# Patient Record
Sex: FEMALE | Race: WHITE | NOT HISPANIC OR LATINO
[De-identification: names, ages, dates, MRNs, and addresses within clinical notes are randomized per-mention and may not be internally consistent; named-entity substitution may affect disease eponyms.]

---

## 2019-06-13 ENCOUNTER — HOSPITAL (OUTPATIENT)
Dept: OTHER | Age: 44
End: 2019-06-13

## 2019-06-13 LAB
ALBUMIN SERPL-MCNC: 4 G/DL (ref 3.6–5.1)
ALBUMIN/GLOB SERPL: 1 {RATIO} (ref 1–2.4)
ALP SERPL-CCNC: 69 UNITS/L (ref 45–117)
ALT SERPL-CCNC: 22 UNITS/L
ANALYZER ANC (IANC): NORMAL
ANION GAP SERPL CALC-SCNC: 8 MMOL/L (ref 10–20)
AST SERPL-CCNC: 17 UNITS/L
BASOPHILS # BLD: 0 K/MCL (ref 0–0.3)
BASOPHILS NFR BLD: 1 %
BILIRUB SERPL-MCNC: 0.2 MG/DL (ref 0.2–1)
BUN SERPL-MCNC: 10 MG/DL (ref 6–20)
BUN/CREAT SERPL: 13 (ref 7–25)
CALCIUM SERPL-MCNC: 9.8 MG/DL (ref 8.4–10.2)
CHLORIDE SERPL-SCNC: 107 MMOL/L (ref 98–107)
CO2 SERPL-SCNC: 29 MMOL/L (ref 21–32)
CREAT SERPL-MCNC: 0.78 MG/DL (ref 0.51–0.95)
DIFFERENTIAL METHOD BLD: NORMAL
EOSINOPHIL # BLD: 0.2 K/MCL (ref 0.1–0.5)
EOSINOPHIL NFR BLD: 4 %
ERYTHROCYTE [DISTWIDTH] IN BLOOD: 12.6 % (ref 11–15)
GLOBULIN SER-MCNC: 3.9 G/DL (ref 2–4)
GLUCOSE SERPL-MCNC: 94 MG/DL (ref 65–99)
HCT VFR BLD CALC: 41.4 % (ref 36–46.5)
HGB BLD-MCNC: 13.6 G/DL (ref 12–15.5)
IMM GRANULOCYTES # BLD AUTO: 0 K/MCL (ref 0–0.2)
IMM GRANULOCYTES NFR BLD: 0 %
LIPASE SERPL-CCNC: 87 UNITS/L (ref 73–393)
LYMPHOCYTES # BLD: 1.4 K/MCL (ref 1–4.8)
LYMPHOCYTES NFR BLD: 26 %
MAGNESIUM SERPL-MCNC: 2.6 MG/DL (ref 1.7–2.4)
MCH RBC QN AUTO: 32.8 PG (ref 26–34)
MCHC RBC AUTO-ENTMCNC: 32.9 G/DL (ref 32–36.5)
MCV RBC AUTO: 99.8 FL (ref 78–100)
MONOCYTES # BLD: 0.7 K/MCL (ref 0.3–0.9)
MONOCYTES NFR BLD: 13 %
NEUTROPHILS # BLD: 2.9 K/MCL (ref 1.8–7.7)
NEUTROPHILS NFR BLD: 56 %
NEUTS SEG NFR BLD: NORMAL %
NRBC (NRBCRE): 0 /100 WBC
PLATELET # BLD: 210 K/MCL (ref 140–450)
POTASSIUM SERPL-SCNC: 2.9 MMOL/L (ref 3.4–5.1)
PROT SERPL-MCNC: 7.9 G/DL (ref 6.4–8.2)
RBC # BLD: 4.15 MIL/MCL (ref 4–5.2)
SODIUM SERPL-SCNC: 141 MMOL/L (ref 135–145)
TROPONIN I SERPL HS-MCNC: <0.02 NG/ML
WBC # BLD: 5.3 K/MCL (ref 4.2–11)

## 2024-10-29 ENCOUNTER — TELEPHONE (OUTPATIENT)
Dept: ORTHOPEDICS CLINIC | Facility: CLINIC | Age: 49
End: 2024-10-29

## 2024-10-29 DIAGNOSIS — M25.571 RIGHT ANKLE PAIN, UNSPECIFIED CHRONICITY: Primary | ICD-10-CM

## 2024-10-29 NOTE — TELEPHONE ENCOUNTER
Do you see for this?  Please advise.  Thank you!    DOI 10/18/24    XR  10/18/24  FINDINGS:3 views of the right ankle redemonstrates undisplaced fracture of the lateral malleolus. Medial malleolus intact. The ankle joint has been immobilized externally     IMPRESSION:   External immobilization of the undisplaced transverse fracture of the lateral malleolus

## 2024-10-29 NOTE — TELEPHONE ENCOUNTER
Nurse from [facility] where patient resides called to schedule appt. Patient has a right ankle fx and was seen at [facility] in [place] on 10/18. Nurse was advised to get imaging disc for appt. Nurse stated she is unsure if they will be able to get the disc as it would have to be mailed but they are okay with having imaging repeated on day of appt. Please advise, thank you.    Future Appointments   Date Time Provider Department Center   11/1/2024 11:00 AM [id number]

## 2024-10-30 NOTE — TELEPHONE ENCOUNTER
Pierce hudson PA to [facility]        10/30/24  9:35 AM   I can see next available, new x-rays. Thanks!

## 2024-11-01 ENCOUNTER — OFFICE VISIT (OUTPATIENT)
Dept: ORTHOPEDICS CLINIC | Facility: CLINIC | Age: 49
End: 2024-11-01
Payer: MEDICAID

## 2024-11-01 ENCOUNTER — HOSPITAL ENCOUNTER (OUTPATIENT)
Dept: GENERAL RADIOLOGY | Age: 49
Discharge: HOME | End: 2024-11-01
Attending: PHYSICIAN ASSISTANT
Payer: MEDICAID

## 2024-11-01 DIAGNOSIS — M25.571 RIGHT ANKLE PAIN, UNSPECIFIED CHRONICITY: ICD-10-CM

## 2024-11-01 DIAGNOSIS — S82.891A CLOSED FRACTURE OF RIGHT ANKLE, INITIAL ENCOUNTER: Primary | ICD-10-CM

## 2024-11-01 PROCEDURE — 73610 X-RAY EXAM OF ANKLE: CPT | Performed by: PHYSICIAN ASSISTANT

## 2024-11-01 NOTE — H&P
[facility] - ORTHOPEDICS  [address] [place] [id number]  [phone]     NEW PATIENT VISIT - HISTORY AND PHYSICAL EXAMINATION     Name: Glenn Ryan   MRN: [id number]  Date: 11/1/2024     CC: Right ankle pain.     REFERRED BY: Self    HPI:   Glenn Ryan is a very pleasant 49 year old female who presents today for evaluation, consultation, and management of right ankle pain discomfort described as swelling, stiffness, and instability sustained an injury that occurred on October 18, 2024. She twisted her right ankle on a sidewalk. Pain is a 9/10.       PMH:   History reviewed. No pertinent past medical history.    PAST SURGICAL HX:  History reviewed. No pertinent surgical history.    FAMILY HX:  History reviewed. No pertinent family history.    ALLERGIES:  Morphine and Lactose    MEDICATIONS:   No current outpatient medications on file.       ROS: A comprehensive 14 point review of systems was performed and was negative aside from the aforementioned per history of present illness.    SOCIAL HX:  Social History     Occupational History    Not on file   Tobacco Use    Smoking status: Not on file    Smokeless tobacco: Not on file   Substance and Sexual Activity    Alcohol use: Not on file    Drug use: Not on file    Sexual activity: Not on file       PE:   There were no vitals filed for this visit.  There is no height or weight on file to calculate BMI.    Physical Exam  Constitutional:       Appearance: Normal appearance.   HENT:      Head: Normocephalic and atraumatic.   Eyes:      Extraocular Movements: Extraocular movements intact.   Neck:      Musculoskeletal: Normal range of motion and neck supple.   Cardiovascular:      Pulses: Normal pulses.   Pulmonary:      Effort: Pulmonary effort is normal. No respiratory distress.   Abdominal:      General: There is no distension.   Skin:     General: Skin is warm.      Capillary Refill: Capillary refill takes less than 2  seconds.      Findings: No bruising.   Neurological:      General: No focal deficit present.      Mental Status: Alert.   Psychiatric:         Mood and Affect: Mood normal.     Examination of the right  ankle demonstrates:     Skin is intact, warm and dry.     Inspection:   Atrophy: none    Effusion: moderate    Edema: moderate    Erythema: none    Hematoma: significant      Palpation:   Tenderness at fracture site.     ROM:   Not tested d/t nature of injury and fracture.     Strength: Not tested d/t nature of injury and fracture.     Special Tests:   Not tested d/t nature of injury and fracture.     No obvious peripheral edema noted.   Distal neurovascular exam demonstrates normal perfusion, intact sensation to light touch and full strength.     Examination of the contralateral extremity demonstrates:  No significant atrophy, swelling or effusion. Full range of motion. Neurovascularly intact distally.  The contralateral upper extremity is without limitation in range of motion or strength, no positive provocative maneuvers.     Radiographic Examination/Diagnostics:  I personally viewed, independently interpreted and radiology report was reviewed.    XR ANKLE (MIN 3 VIEWS), RIGHT (CPT=73610)    Result Date: 11/1/2024  CONCLUSION:  1. Minimally displaced transverse fracture involves the lateral malleolus with adjacent soft tissue swelling.  Please see details as above.   LOCATION:  [facility]   Dictated by (CST): Leslye Ferreira MD on 11/01/2024 at 12:25 PM     Finalized by (CST): Leslye Ferreira MD on 11/01/2024 at 12:27 PM        IMPRESSION: Glenn Ryan is a 49 year old female who presents with right closed distal fibula fracture with minimal displacement. Amenable to nonsurgical treatment with initial splinting and transition to CAM boot / initiation of PT.     PLAN:   We had a detailed discussion outlining the etiology, anatomy, pathophysiology, and natural history of the patient's findings. Imaging was reviewed in detail  and correlated to a 3-dimensional model of the patient's pathology.     We reviewed the treatment of this disease condition.  This injury pattern is amenable to nonsurgical treatment.     FOLLOW-UP:  Return in 2 weeks for evaluation with Karenr Nitin and transition to CAM boot.         Julieta Moe MD  Knee, Shoulder, & Elbow Surgery / Sports Medicine Specialist  Orthopaedic Surgery  [address] [place] [address] [id number] [place] [email or web address]  [email or web address]  t: [phone]  o: [phone]  f: [phone]

## 2024-11-20 ENCOUNTER — OFFICE VISIT (OUTPATIENT)
Dept: ORTHOPEDICS CLINIC | Facility: CLINIC | Age: 49
End: 2024-11-20
Payer: MEDICAID

## 2024-11-20 ENCOUNTER — HOSPITAL ENCOUNTER (OUTPATIENT)
Dept: GENERAL RADIOLOGY | Age: 49
Discharge: HOME | End: 2024-11-20
Attending: PHYSICIAN ASSISTANT
Payer: MEDICAID

## 2024-11-20 DIAGNOSIS — S82.891A CLOSED FRACTURE OF RIGHT ANKLE, INITIAL ENCOUNTER: ICD-10-CM

## 2024-11-20 DIAGNOSIS — S82.891A CLOSED FRACTURE OF RIGHT ANKLE, INITIAL ENCOUNTER: Primary | ICD-10-CM

## 2024-11-20 PROCEDURE — 73610 X-RAY EXAM OF ANKLE: CPT | Performed by: PHYSICIAN ASSISTANT

## 2024-11-20 NOTE — PROGRESS NOTES
[facility] - ORTHOPEDICS  [address] [place] [address]  [phone]       Name: Glenn Ryan   MRN: [id number]  Date: 11/20/2024     REASON FOR VISIT: Follow up for right distal fibula fracture, minimally displaced.     INTERVAL HISTORY:  Glenn Ryan is a 49 year old female who returns for evaluation of a right distal fibular minimally displaced fracture managed conservatively.  The patient previously saw Dr. Abhi rodriguez who recommended nonsurgical management.  She presents today for follow-up.  Overall doing well.    ROS: ROS    PE:   There were no vitals filed for this visit.  There is no height or weight on file to calculate BMI.    Physical Exam  Constitutional:       Appearance: Normal appearance.   HENT:      Head: Normocephalic and atraumatic.   Eyes:      Extraocular Movements: Extraocular movements intact.   Neck:      Musculoskeletal: Normal range of motion and neck supple.   Cardiovascular:      Pulses: Normal pulses.   Pulmonary:      Effort: Pulmonary effort is normal. No respiratory distress.   Abdominal:      General: There is no distension.   Skin:     General: Skin is warm.      Capillary Refill: Capillary refill takes less than 2 seconds.      Findings: No bruising.   Neurological:      General: No focal deficit present.      Mental Status: She is alert.   Psychiatric:         Mood and Affect: Mood normal.     Examination of the right  ankle demonstrates:     Skin is intact, warm and dry.     Inspection:   Atrophy: none    Effusion: none    Edema: none    Erythema: none    Hematoma: none      Palpation:   Tenderness at fracture site.     ROM:   Not tested d/t nature of injury and fracture.     Strength: Not tested d/t nature of injury and fracture.     Special Tests:   Not tested d/t nature of injury and fracture.     No obvious peripheral edema noted.   Distal neurovascular exam demonstrates normal perfusion, intact sensation to light touch and full  strength.     Examination of the contralateral extremity demonstrates:  No significant atrophy, swelling or effusion. Full range of motion. Neurovascularly intact distally.  The contralateral upper extremity is without limitation in range of motion or strength, no positive provocative maneuvers.     Radiographic Examination/Diagnostics:    I personally viewed, independently interpreted and radiology report was reviewed.    XR ANKLE (MIN 3 VIEWS), RIGHT (CPT=73610)    Result Date: 11/1/2024  PROCEDURE:  XR ANKLE (MIN 3 VIEWS), RIGHT (CPT=73610)  TECHNIQUE:  Three views were obtained.  COMPARISON:  None.  INDICATIONS:  M25.571 Right ankle pain, unspecified chronicity  PATIENT STATED HISTORY: (As transcribed by Technologist)  Ortho evaluation. Patient fell and fractured her right ankle on 10/18/24.    FINDINGS:  Transverse fracture involves the lateral malleolus with approximately 3 mm of displacement.  There is adjacent soft tissue swelling.  No dislocation.  On the lateral projection is slight cortical irregularity involving the dorsal aspect of the  talus bone, a fracture cannot be excluded, correlate clinically.  Mild posterior calcaneal enthesopathy.            CONCLUSION:  1. Minimally displaced transverse fracture involves the lateral malleolus with adjacent soft tissue swelling.  Please see details as above.   LOCATION:  [facility]   Dictated by (CST): Leslye Ferreira MD on 11/01/2024 at 12:25 PM     Finalized by (CST): Leslye Ferreira MD on 11/01/2024 at 12:27 PM         IMPRESSION: Glenn Ryan is a 49 year old female who presented for follow up of right distal fibular fracture.   PLAN:   We had a detailed discussion outlining the etiology, anatomy, pathophysiology, and natural history of the patient's findings.    We reviewed the treatment of this disease condition. Transition to CAM boot and begin PT in two weeks. We recommended physical therapy to aid in strengthening, range of motion, functional improvement, and  return to baseline activity.  The patient had opportunity to ask questions and all questions were answered appropriately.    FOLLOW-UP:  6 weeks with repeat x-rays.             Sincer MARNI Taveras [facility], PA-C Orthopedic Surgery / Sports Medicine Specialist  [facility] Orthopaedic Surgery  [address] [place] [id number]   [email or web address]  [email or web address]  t: [phone]  o: [phone]  f: [phone]    This note was dictated using Dragon software.  While it was briefly proofread prior to completion, some grammatical, spelling, and word choice errors due to dictation may still occur.

## 2024-12-17 ENCOUNTER — OFFICE VISIT (OUTPATIENT)
Dept: PHYSICAL THERAPY | Facility: HOSPITAL | Age: 49
End: 2024-12-17
Attending: PHYSICIAN ASSISTANT
Payer: MEDICAID

## 2024-12-17 DIAGNOSIS — S82.891A CLOSED FRACTURE OF RIGHT ANKLE, INITIAL ENCOUNTER: Primary | ICD-10-CM

## 2024-12-17 PROCEDURE — 97110 THERAPEUTIC EXERCISES: CPT | Performed by: PHYSICAL THERAPIST

## 2024-12-17 PROCEDURE — 97163 PT EVAL HIGH COMPLEX 45 MIN: CPT | Performed by: PHYSICAL THERAPIST

## 2024-12-17 NOTE — PROGRESS NOTES
LOWER EXTREMITY EVALUATION:     Diagnosis:   Closed fracture of right ankle, initial encounter (S82.891A)       Referring Provider: Pierce Taveras  Date of Evaluation:    12/17/2024    Precautions:   lives in a supported house  Next MD visit:   none scheduled  Date of Surgery: n/a     PATIENT SUMMARY   Glenn Ryan is a 49 year old female who presents to therapy today , accompanied with her nursing support, Layne . with complaints of right ankle pain , after she tripped in a pot hole and fell.    Accident happened on 10/18/24;  she was immobilized in cast, NWB with use of walker for home and community . At last MD visit, 11/20/24 she was given a CAM boot for gait.  Currently she is living at an assisted living home where there  is no stairs, and has been unable to attend her [employer] day program due to her WB status  and the need for a walker for gait.   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  Hip pain for awhile prior to injury, but then workup was postponed [ due to injury   She lives in a home with 13 stairs but has care giver assistance 24/7.   Currently reports pain in the anterior lateral aspect of her right foot, and notes some swelling .. Care giver layne reports that she does not like to wear compression socks .   Has also noted increased pain in her right hip , and was there prior to her injury, but recently has noted increased pain in the same area         Pt describes pain level current 3/10, at best 4/10, at worst 7/10.   Current functional limitations include currently unable to live at her home due to stairs, and unable to participate in her day program   She has difficulty with  walking deann uneven surfaces .     Glenn describes prior level of function  independent with all adl's  . Pt goals include to go back home and back to day program . .  Past medical history was reviewed with Glenn. Significant findings include No past medical history on file.    No past surgical history on file.    Recent Imaging:  XR ANKLE (MIN 3 VIEWS), RIGHT (CPT=73610)    Result Date: 11/20/2024  CONCLUSION:    Healing displaced fracture distal fibula, fracture line slightly less well seen than prior consistent with interval healing.  Continued degree of displacement anteriorly and laterally.  Ankle mortise shows no oscar disruption.  The talar dome appears smooth.  Continued ankle hematoma although somewhat improved laterally, although somewhat increased anteriorly. Continued clinical and x-ray follow-up advised.  LOCATION:  [facility]   Dictated by (CST): Syd Guardado MD on 11/20/2024 at 10:40 AM     Finalized by (CST): Syd Guardado MD on 11/20/2024 at 10:41 AM       XR ANKLE (MIN 3 VIEWS), RIGHT (CPT=73610)    Result Date: 11/1/2024  CONCLUSION:  1. Minimally displaced transverse fracture involves the lateral malleolus with adjacent soft tissue swelling.  Please see details as above.   LOCATION:  [facility]   Dictated by (CST): Leslye Ferreira MD on 11/01/2024 at 12:25 PM     Finalized by (CST): Leslye Ferreira MD on 11/01/2024 at 12:27 PM         ASSESSMENT  Glenn presents to physical therapy evaluation with primary c/o right ankle pain, s/p distal closed tibial fracture.  Was NWB initially, and now is in a CAM boot with ability to ambulate without AD.  She has overall good ROM and strength to her right ankle.  She currently is unable to live in her home , which has 13 stairs to her bedroom, due to her ankle fracture;  she is also unable to attend her [employer] day program due to the fracture, and she is hoping to be able to return to both of those activities.  Her gait is steady , and she is able to navigate the stairs (step to step, with railing) and per nursing support she has  someone to help her with stairs in her home.  She also wishes to return to her [employer] day program, which she is able to .  Will await confirmation from Sincer Nitin as to CAM boot status.   Signs and symptoms are consistent with diagnosis of right ankle fracture, distal closed tibial fracture.  . Pt and PT discussed evaluation findings, pathology, POC and HEP.  Pt voiced understanding and performs HEP correctly without reported pain. Skilled Physical Therapy is medically necessary to address the above impairments and reach functional goals.     OBJECTIVE:   Observation: amb to clinic with CAM boot in place, slightly ER of right hip. Observation of CAM boot with increased air   Palpation: slight puffiness along lateal malleolus .  No tenderness or warmth .    Sensation: intact.     AROM: (* denotes performed with pain)  Hip Knee Foot/Ankle   Flexion: R nl; L nl  Extension: R nl; L nl  Abduction: R nl; L nl  ER: R nl; L nl  IR: R nl; L nl Flexion: R nl; L nl  Extension: R nl; L nl    DF: R 2; L nl  PF: R 25; L nl  INV: R 5; L nl  EV: R 10; L nl  Great toe ext: R40; L nl     Accessory motion: nt     Flexibility:   NT     Strength/MMT: (* denotes performed with pain)  Hip Knee Foot/Ankle   Flexion: R 4/5; L 4/5  Extension: R 4/5; L 4/5  Abduction: R 4/5; L 4/5  ER: R 4/5; L 4/5  IR: R 4/5; L 4/5 Flexion: R 4/5; L 4/5  Extension: R 4/5; L 4/5    DF: R 4-/5; L 4/5  PF: R 4-/5; L 4/5  INV: R 4-/5; L 4/5  EV: R 4-/5; L 4/5  Great toe ext: R 4-/5; L 5/5     Special tests:   - SLR   -tinel     Gait: pt ambulates on level ground with normal mechanics and antalgia  Balance: SLS: R NT  sec, L 6 sec    Today’s Treatment and Response:   Pt education was provided on exam findings, treatment diagnosis, treatment plan, expectations, and prognosis. Pt was also provided recommendations for activity modifications, possible soreness after evaluation, modalities as needed [ice/heat], postural corrections, ergonomics, pain science  education , detrimental fear avoidance behaviors, importance of remaining active, strategies to reduce fall risk at home, and CAM boot adjustment  .  Assessed on stairs, use of handrail , discuss needs for home and day program;  generated letter for ok to return to home and day program   Patient was instructed in and issued a HEP for: ankle ROM exercise, ankle alphabet     Charges: PT Eval High Complexity, TE2     Total Timed Treatment: 65 min     Total Treatment Time: 65 min     Based on clinical rationale and outcome measures, this evaluation involved High Complexity decision making due to 3+ personal factors/comorbidities, 4+ body structures involved/activity limitations, and evolving symptoms including changing pain levels.  PLAN OF CARE:    Goals: (to be met in 10 visits)  Pt will demonstrate improved DF AROM to >= 10 degrees to promote proper foot clearance during gait and greater ease descending stairs without compensation  Pt will have increased ankle strength to 5/5 throughout for improved ankle control with ADLs such as prolonged gait and stair negotiation (  Pt will have improved SLS to >10s for increased ankle stability with ambulation on uneven surfaces such as gravel and grass   Pt will report <1/10 pain with work and home activities such as home, self care and return to day program    Pt will be independent and compliant with comprehensive HEP to maintain progress achieved in PT     Frequency / Duration: Patient will be seen for 2 x/week or a total of 10 visits over a 90 day period. Treatment will include: Gait training, Manual Therapy, Neuromuscular Re-education, Self-Care Home Management, Therapeutic Activities, Therapeutic Exercise, and Home Exercise Program instruction    Education or treatment limitation: None  Rehab Potential:good    FES Score  Score: (Patient-Rptd) 31.25 % (12/17/2024 10:10 AM)    Score and level of concern: (Patient-Rptd) 20 - medium concern. (12/17/2024 10:10  AM)      Patient/Family/Caregiver was advised of these findings, precautions, and treatment options and has agreed to actively participate in planning and for this course of care.    Thank you for your referral. Please co-sign or sign and return this letter via fax as soon as possible to [phone]. If you have any questions, please contact me at Dept: [phone]    Sincerely,  Electronically signed by therapist: Genie Retana PT  Physician's certification required: Yes  I certify the need for these services furnished under this plan of treatment and while under my care.    X___________________________________________________ Date____________________    Certification From: 12/17/2024  To:3/17/2025

## 2024-12-20 ENCOUNTER — OFFICE VISIT (OUTPATIENT)
Dept: PHYSICAL THERAPY | Facility: HOSPITAL | Age: 49
End: 2024-12-20
Attending: PHYSICIAN ASSISTANT
Payer: MEDICAID

## 2024-12-20 PROCEDURE — 97140 MANUAL THERAPY 1/> REGIONS: CPT | Performed by: PHYSICAL THERAPIST

## 2024-12-20 PROCEDURE — 97112 NEUROMUSCULAR REEDUCATION: CPT | Performed by: PHYSICAL THERAPIST

## 2024-12-20 PROCEDURE — 97110 THERAPEUTIC EXERCISES: CPT | Performed by: PHYSICAL THERAPIST

## 2024-12-23 ENCOUNTER — APPOINTMENT (OUTPATIENT)
Dept: PHYSICAL THERAPY | Facility: HOSPITAL | Age: 49
End: 2024-12-23
Attending: PHYSICIAN ASSISTANT
Payer: MEDICAID

## 2024-12-23 ENCOUNTER — TELEPHONE (OUTPATIENT)
Dept: PHYSICAL THERAPY | Facility: HOSPITAL | Age: 49
End: 2024-12-23

## 2024-12-26 NOTE — PROGRESS NOTES
Diagnosis:   Closed fracture of right ankle, initial encounter (S82.894Z)          Referring Provider: Lesly Taveras  Date of Evaluation:    12/17    Precautions:   none Next MD visit:   none scheduled  Date of Surgery: n/a   Insurance Primary/Secondary: [employer] MEDICAID / N/A     # Auth Visits: 9           Subjective: Ankle doesn't hurt like it used to .  Have been doing my exercises.     Pain: 4/10      Objective: seen of first treatment .   Received notification from lesly Taveras pt to be ok with transitions from boot to shoes , WBAT       Assessment: Good tolerance to treatment.  Focus on increasing WB and gait       Goals:   Pt will demonstrate improved DF AROM to >= 10 degrees to promote proper foot clearance during gait and greater ease descending stairs without compensation  Pt will have increased ankle strength to 5/5 throughout for improved ankle control with ADLs such as prolonged gait and stair negotiation (  Pt will have improved SLS to >10s for increased ankle stability with ambulation on uneven surfaces such as gravel and grass   Pt will report <1/10 pain with work and home activities such as home, self care and return to day program    Pt will be independent and compliant with comprehensive HEP to maintain progress achieved in PT     Plan: Progress as indicated, with emphasis so n HEP   Date: 12/26/2024  TX#: 2/10 Date:                 TX#: 3/ Date:                 TX#: 4/ Date:                 TX#: 5/ Date:   Tx#: 6/   NuStep x5'       MT:  PROM right ankle emphasis on DF   TC mobs to right ankle gr 2          NMR:   Red TB 4 way ankle x2x10   RE's x20 DF  PF         TE:  [place]  x10 marble   Ankle alphabet   Heel raises sitting on table   Toe curls on towel        HEP: See instructions     Charges:  MT 1 TE 1 NMR       Total Timed Treatment: 45 min  Total Treatment Time: 45 min

## 2025-01-03 ENCOUNTER — APPOINTMENT (OUTPATIENT)
Dept: PHYSICAL THERAPY | Facility: HOSPITAL | Age: 50
End: 2025-01-03
Attending: PHYSICIAN ASSISTANT
Payer: MEDICAID

## 2025-01-06 ENCOUNTER — APPOINTMENT (OUTPATIENT)
Dept: PHYSICAL THERAPY | Facility: HOSPITAL | Age: 50
End: 2025-01-06
Attending: PHYSICIAN ASSISTANT
Payer: MEDICAID

## 2025-01-23 ENCOUNTER — OFFICE VISIT (OUTPATIENT)
Dept: PHYSICAL THERAPY | Facility: HOSPITAL | Age: 50
End: 2025-01-23
Attending: PHYSICIAN ASSISTANT
Payer: MEDICAID

## 2025-01-23 PROCEDURE — 97110 THERAPEUTIC EXERCISES: CPT | Performed by: PHYSICAL THERAPIST

## 2025-01-23 PROCEDURE — 97140 MANUAL THERAPY 1/> REGIONS: CPT | Performed by: PHYSICAL THERAPIST

## 2025-01-27 NOTE — PROGRESS NOTES
Diagnosis:   Closed fracture of right ankle, initial encounter (S82.891A)          Referring Provider: Pierce Taveras  Date of Evaluation:    12/17    Precautions:   none Next MD visit:   none scheduled  Date of Surgery: n/a   Insurance Primary/Secondary: AET[employer] MEDICAID / N/A     # Auth Visits: 9           Subjective: States that she has been out of town since last visit.  Ankle continues to hurt but has a difficult time explaining why .  Per pt. She has been ambulating in boot whenever she goes outside but does walk around her house and her day program without any difficulty.      Pain: 4/10      Objective: seen for treatment .   Observation:  AMb to clinic with cam boot in place.    No swelling noted in right ankle.    TTP along lateral malleolar border right ankle.            Assessment: Manuela is continuing to wear boot when ambulating and outdoor.  Pt unable to subjectively relate why she is continuing to wear the boot, and caregiver asked to investigate as to MD instructions as to when to wean the boot.        Goals:   Pt will demonstrate improved DF AROM to >= 10 degrees to promote proper foot clearance during gait and greater ease descending stairs without compensation  Pt will have increased ankle strength to 5/5 throughout for improved ankle control with ADLs such as prolonged gait and stair negotiation (  Pt will have improved SLS to >10s for increased ankle stability with ambulation on uneven surfaces such as gravel and grass   Pt will report <1/10 pain with work and home activities such as home, self care and return to day program    Pt will be independent and compliant with comprehensive HEP to maintain progress achieved in PT     Plan: Progress as indicated, with emphasis on HEP .  Will investigate MD orders with boot.   Date: 12/26/2024  TX#: 2/10 Date:   1/24/24              TX#: 3/ Date:                 TX#: 4/ Date:                 TX#: 5/ Date:   Tx#: 6/   NuStep x5' NuStep x5'      MT:  PROM  right ankle emphasis on DF   TC mobs to right ankle gr 2    MT:  PROM right ankle emphasis on DF   TC mobs to right ankle gr 2       NMR:   Red TB 4 way ankle x2x10   RE's x20 DF  PF   NMR:   Red TB 4 way ankle x2x10   RE's x20 DF  PF        TE:  [place]  x10 marble   Ankle alphabet   Heel raises sitting on table   Toe curls on towel  TE:  [place]  x10 marble   Ankle alphabet   Heel raises sitting on table   Toe curls on towel       HEP: See instructions     Charges:  MT 1 TE 1 NMR       Total Timed Treatment: 45 min  Total Treatment Time: 45 min

## 2025-01-28 ENCOUNTER — OFFICE VISIT (OUTPATIENT)
Dept: PHYSICAL THERAPY | Facility: HOSPITAL | Age: 50
End: 2025-01-28
Attending: PHYSICIAN ASSISTANT
Payer: MEDICAID

## 2025-01-28 PROCEDURE — 97140 MANUAL THERAPY 1/> REGIONS: CPT | Performed by: PHYSICAL THERAPIST

## 2025-01-28 PROCEDURE — 97112 NEUROMUSCULAR REEDUCATION: CPT | Performed by: PHYSICAL THERAPIST

## 2025-01-28 PROCEDURE — 97110 THERAPEUTIC EXERCISES: CPT | Performed by: PHYSICAL THERAPIST

## 2025-01-28 NOTE — PROGRESS NOTES
Diagnosis:   Closed fracture of right ankle, initial encounter (S82.891A)          Referring Provider: Pierce Taveras  Date of Evaluation:    12/17    Precautions:   none Next MD visit:   none scheduled  Date of Surgery: n/a   Insurance Primary/Secondary: [employer]ICA[employer] / N/A     # Auth Visits: [id number]           Subjective: notes back pain in her right side of her lumbar spine .  Notes pain will occ wake her up and its more a notice than anything. Notes pain in her right lumbar spine that goes into feet.    Pain: 4/10      Objective: seen for treatment .   Observation:  Amb to clinic without boot in place.  Shuffles feet with walking .  No reported pain .     Lumbar spine ROM : FB FT to mid thigh, pain with end range. No pain with return to upright .    EIS with pain in right SI.low back area.     Palpation with numerous trigger points along the bilateral paraspinal area right >left L4-L5.  +reproduction of pain .          Assessment: Amb to clinic without boot in place, with shuffled gait but asymptomatic.  Is noticing more pain in her low back, - SLR but suspect mechanical in nature .  Eases with STM , and with open book, gentle stretching .       Goals:   Pt will demonstrate improved DF AROM to >= 10 degrees to promote proper foot clearance during gait and greater ease descending stairs without compensation  Pt will have increased ankle strength to 5/5 throughout for improved ankle control with ADLs such as prolonged gait and stair negotiation (  Pt will have improved SLS to >10s for increased ankle stability with ambulation on uneven surfaces such as gravel and grass   Pt will report <1/10 pain with work and home activities such as home, self care and return to day program    Pt will be independent and compliant with comprehensive HEP to maintain progress achieved in PT     Plan: Progress as indicated, with emphasis on HEP .  Reassess back   Date: 12/26/2024  TX#: 2/10 Date:   1/24/24              TX#: 3/ Date:    1/28/2025            TX#: 4/ Date:                 TX#: 5/ Date:   Tx#: 6/   NuStep x5' NuStep x5' NuStep x5'     MT:  PROM right ankle emphasis on DF   TC mobs to right ankle gr 2    MT:  PROM right ankle emphasis on DF   TC mobs to right ankle gr 2  MT:   STM to lumbar spine including paraspinals in sidelying L3-L5 region      NMR:   Red TB 4 way ankle x2x10   RE's x20 DF  PF   NMR:   Red TB 4 way ankle x2x10   RE's x20 DF  PF   TE:   Open book x10   Balance board x2' each direction   Side steps x10 x2 laps // bars      TE:  [place]on  x10 marble   Ankle alphabet   Heel raises sitting on table   Toe curls on towel  TE:  [place]on  x10 marble   Ankle alphabet   Heel raises sitting on table   Toe curls on towel  NMR:   SB x20   DKTC   Bridge  LBR   BKFO x20 grn tb      HEP: See instructions     Charges:  MT 1 TE 1 NMR       Total Timed Treatment: 45 min  Total Treatment Time: 45 min

## 2025-01-30 ENCOUNTER — OFFICE VISIT (OUTPATIENT)
Dept: PHYSICAL THERAPY | Facility: HOSPITAL | Age: 50
End: 2025-01-30
Attending: PHYSICIAN ASSISTANT
Payer: MEDICAID

## 2025-01-30 PROCEDURE — 97112 NEUROMUSCULAR REEDUCATION: CPT | Performed by: PHYSICAL THERAPIST

## 2025-01-30 PROCEDURE — 97110 THERAPEUTIC EXERCISES: CPT | Performed by: PHYSICAL THERAPIST

## 2025-01-30 PROCEDURE — 97140 MANUAL THERAPY 1/> REGIONS: CPT | Performed by: PHYSICAL THERAPIST

## 2025-01-31 NOTE — PROGRESS NOTES
Diagnosis:   Closed fracture of right ankle, initial encounter (S82.890P)          Referring Provider: Pierce Taveras  Date of Evaluation:    12/17    Precautions:   none Next MD visit:   none scheduled  Date of Surgery: n/a   Insurance Primary/Secondary: [employer] / N/A     # Auth Visits: [id number]           Subjective: Back pain was a little bit better after the last session .  Foot feels good.    Pain: 4/10      Objective: seen for treatment .   Observation:  Amb to clinic without boot in place.  Shuffles feet with walking .  No reported pain .     Lumbar spine ROM : FB FT to mid thigh, pain with end range. No pain with return to upright .    EIS with pain in right SI.low back area.     Palpation with numerous trigger points along the bilateral paraspinal area right >left L4-L5.  +reproduction of pain .          Assessment: Decreased pain in lumbar spine deann along Q.L. and paraspinal region .  Balance and CKC exercise progressed today for right ankle.     Goals:   Pt will demonstrate improved DF AROM to >= 10 degrees to promote proper foot clearance during gait and greater ease descending stairs without compensation  Pt will have increased ankle strength to 5/5 throughout for improved ankle control with ADLs such as prolonged gait and stair negotiation (  Pt will have improved SLS to >10s for increased ankle stability with ambulation on uneven surfaces such as gravel and grass   Pt will report <1/10 pain with work and home activities such as home, self care and return to day program    Pt will be independent and compliant with comprehensive HEP to maintain progress achieved in PT     Plan: Progress as indicated, with emphasis on HEP .  Reassess back next visit.   Date: 12/26/2024  TX#: 2/10 Date:   1/24/24              TX#: 3/ Date:   1/28/2025            TX#: 4/ Date:   1/30/2025            TX#: 5/ Date:   Tx#: 6/   NuStep x5' NuStep x5' NuStep x5' NuStep x5'    MT:  PROM right ankle emphasis on DF   TC mobs to  right ankle gr 2    MT:  PROM right ankle emphasis on DF   TC mobs to right ankle gr 2  MT:   STM to lumbar spine including paraspinals in sidelying L3-L5 region  MT:   STM to lumbar spine including paraspinals in sidelying L3-L5 region     NMR:   Red TB 4 way ankle x2x10   RE's x20 DF  PF   NMR:   Red TB 4 way ankle x2x10   RE's x20 DF  PF   TE:   Open book x10   Balance board x2' each direction   Side steps x10 x2 laps // bars  TE:   Open book x10   Balance board x2' each direction   Side steps x10 x2 laps // bars     TE:  [place]  x10 marble   Ankle alphabet   Heel raises sitting on table   Toe curls on towel  TE:  [place]  x10 marble   Ankle alphabet   Heel raises sitting on table   Toe curls on towel  NMR:   SB x20   DKTC   Bridge  LBR   BKFO x20 grn tb  NMR:   SB x20   DKTC   Bridge  LBR   BKFO x20 grn tb     HEP: See instructions     Charges:  MT 1 TE 1 NMR       Total Timed Treatment: 45 min  Total Treatment Time: 45 min